# Patient Record
Sex: MALE | Race: BLACK OR AFRICAN AMERICAN | NOT HISPANIC OR LATINO | Employment: UNEMPLOYED | URBAN - METROPOLITAN AREA
[De-identification: names, ages, dates, MRNs, and addresses within clinical notes are randomized per-mention and may not be internally consistent; named-entity substitution may affect disease eponyms.]

---

## 2023-10-31 ENCOUNTER — HOSPITAL ENCOUNTER (OUTPATIENT)
Facility: HOSPITAL | Age: 53
Discharge: HOME | End: 2023-11-01
Attending: EMERGENCY MEDICINE | Admitting: EMERGENCY MEDICINE
Payer: MEDICAID

## 2023-10-31 DIAGNOSIS — I10 PRIMARY HYPERTENSION: ICD-10-CM

## 2023-10-31 DIAGNOSIS — E87.6 HYPOKALEMIA: ICD-10-CM

## 2023-10-31 DIAGNOSIS — R07.9 CHEST PAIN: ICD-10-CM

## 2023-10-31 DIAGNOSIS — F10.921 ALCOHOL INTOXICATION WITH DELIRIUM: Primary | ICD-10-CM

## 2023-10-31 DIAGNOSIS — R41.82 ALTERED MENTAL STATUS: ICD-10-CM

## 2023-10-31 DIAGNOSIS — E83.42 HYPOMAGNESEMIA: ICD-10-CM

## 2023-10-31 LAB
ALLENS TEST: ABNORMAL
APAP SERPL-MCNC: <3 UG/ML (ref 10–20)
BASOPHILS # BLD AUTO: 0.05 K/UL (ref 0–0.2)
BASOPHILS NFR BLD: 0.7 % (ref 0–1.9)
BNP SERPL-MCNC: 34 PG/ML (ref 0–99)
DIFFERENTIAL METHOD: ABNORMAL
EOSINOPHIL # BLD AUTO: 0.1 K/UL (ref 0–0.5)
EOSINOPHIL NFR BLD: 1.3 % (ref 0–8)
ERYTHROCYTE [DISTWIDTH] IN BLOOD BY AUTOMATED COUNT: 12.4 % (ref 11.5–14.5)
ETHANOL SERPL-MCNC: 244 MG/DL
HCO3 UR-SCNC: 14.5 MMOL/L (ref 24–28)
HCT VFR BLD AUTO: 41 % (ref 40–54)
HGB BLD-MCNC: 13.1 G/DL (ref 14–18)
IMM GRANULOCYTES # BLD AUTO: 0.09 K/UL (ref 0–0.04)
IMM GRANULOCYTES NFR BLD AUTO: 1.2 % (ref 0–0.5)
LYMPHOCYTES # BLD AUTO: 3.3 K/UL (ref 1–4.8)
LYMPHOCYTES NFR BLD: 42.6 % (ref 18–48)
MCH RBC QN AUTO: 30.3 PG (ref 27–31)
MCHC RBC AUTO-ENTMCNC: 32 G/DL (ref 32–36)
MCV RBC AUTO: 95 FL (ref 82–98)
MONOCYTES # BLD AUTO: 0.6 K/UL (ref 0.3–1)
MONOCYTES NFR BLD: 7.6 % (ref 4–15)
NEUTROPHILS # BLD AUTO: 3.6 K/UL (ref 1.8–7.7)
NEUTROPHILS NFR BLD: 46.6 % (ref 38–73)
NRBC BLD-RTO: 0 /100 WBC
PCO2 BLDA: 38.1 MMHG (ref 35–45)
PH SMN: 7.19 [PH] (ref 7.35–7.45)
PLATELET # BLD AUTO: 302 K/UL (ref 150–450)
PMV BLD AUTO: 9 FL (ref 9.2–12.9)
PO2 BLDA: 28 MMHG (ref 40–60)
POC BE: -14 MMOL/L
POC SATURATED O2: 39 % (ref 95–100)
POC TCO2: 16 MMOL/L (ref 24–29)
RBC # BLD AUTO: 4.32 M/UL (ref 4.6–6.2)
SALICYLATES SERPL-MCNC: <5 MG/DL (ref 15–30)
SAMPLE: ABNORMAL
SITE: ABNORMAL
T4 FREE SERPL-MCNC: 1.12 NG/DL (ref 0.71–1.51)
TROPONIN I SERPL DL<=0.01 NG/ML-MCNC: <0.006 NG/ML (ref 0–0.03)
TSH SERPL DL<=0.005 MIU/L-ACNC: 0.34 UIU/ML (ref 0.4–4)
WBC # BLD AUTO: 7.67 K/UL (ref 3.9–12.7)

## 2023-10-31 PROCEDURE — 93010 ELECTROCARDIOGRAM REPORT: CPT | Mod: ,,, | Performed by: INTERNAL MEDICINE

## 2023-10-31 PROCEDURE — 99900035 HC TECH TIME PER 15 MIN (STAT)

## 2023-10-31 PROCEDURE — 82803 BLOOD GASES ANY COMBINATION: CPT

## 2023-10-31 PROCEDURE — 99285 EMERGENCY DEPT VISIT HI MDM: CPT | Mod: 25

## 2023-10-31 PROCEDURE — 82077 ASSAY SPEC XCP UR&BREATH IA: CPT

## 2023-10-31 PROCEDURE — 84439 ASSAY OF FREE THYROXINE: CPT

## 2023-10-31 PROCEDURE — 80143 DRUG ASSAY ACETAMINOPHEN: CPT

## 2023-10-31 PROCEDURE — 85025 COMPLETE CBC W/AUTO DIFF WBC: CPT

## 2023-10-31 PROCEDURE — 96375 TX/PRO/DX INJ NEW DRUG ADDON: CPT

## 2023-10-31 PROCEDURE — 93010 EKG 12-LEAD: ICD-10-PCS | Mod: ,,, | Performed by: INTERNAL MEDICINE

## 2023-10-31 PROCEDURE — 80179 DRUG ASSAY SALICYLATE: CPT

## 2023-10-31 PROCEDURE — 84484 ASSAY OF TROPONIN QUANT: CPT

## 2023-10-31 PROCEDURE — 93005 ELECTROCARDIOGRAM TRACING: CPT

## 2023-10-31 PROCEDURE — 84443 ASSAY THYROID STIM HORMONE: CPT

## 2023-10-31 PROCEDURE — 83880 ASSAY OF NATRIURETIC PEPTIDE: CPT

## 2023-10-31 PROCEDURE — 63600175 PHARM REV CODE 636 W HCPCS

## 2023-10-31 PROCEDURE — 80053 COMPREHEN METABOLIC PANEL: CPT

## 2023-10-31 RX ORDER — ONDANSETRON 2 MG/ML
4 INJECTION INTRAMUSCULAR; INTRAVENOUS
Status: COMPLETED | OUTPATIENT
Start: 2023-10-31 | End: 2023-10-31

## 2023-10-31 RX ADMIN — ONDANSETRON 4 MG: 2 INJECTION INTRAMUSCULAR; INTRAVENOUS at 09:10

## 2023-10-31 NOTE — Clinical Note
Called pt back to let him know that [facility] lab is still in process, agreeable to discuss all results at upcoming MDV on 5/23   Diagnosis: Alcohol intoxication with delirium [[id number]]   Future Attending Provider: PHUC NIXON [[id number]]   Admitting Provider:: YOLIE CASTANEDA [[id number]]

## 2023-11-01 VITALS
OXYGEN SATURATION: 95 % | WEIGHT: 210 LBS | HEART RATE: 87 BPM | BODY MASS INDEX: 32.96 KG/M2 | TEMPERATURE: 98 F | RESPIRATION RATE: 18 BRPM | DIASTOLIC BLOOD PRESSURE: 100 MMHG | HEIGHT: 67 IN | SYSTOLIC BLOOD PRESSURE: 184 MMHG

## 2023-11-01 PROBLEM — R41.82 ALTERED MENTAL STATUS: Status: ACTIVE | Noted: 2023-11-01

## 2023-11-01 PROBLEM — R82.71 ASYMPTOMATIC BACTERIURIA: Status: ACTIVE | Noted: 2023-11-01

## 2023-11-01 PROBLEM — R45.851 SUICIDAL IDEATION: Status: ACTIVE | Noted: 2023-11-01

## 2023-11-01 PROBLEM — F10.10 ALCOHOL ABUSE: Status: ACTIVE | Noted: 2023-11-01

## 2023-11-01 PROBLEM — F10.20 ALCOHOLISM: Status: ACTIVE | Noted: 2023-11-01

## 2023-11-01 PROBLEM — F10.20 ALCOHOLISM: Status: RESOLVED | Noted: 2023-11-01 | Resolved: 2023-11-01

## 2023-11-01 PROBLEM — E87.20 METABOLIC ACIDOSIS, NORMAL ANION GAP (NAG): Status: ACTIVE | Noted: 2023-11-01

## 2023-11-01 LAB
ALBUMIN SERPL BCP-MCNC: 3.4 G/DL (ref 3.5–5.2)
ALLENS TEST: ABNORMAL
ALP SERPL-CCNC: 47 U/L (ref 55–135)
ALT SERPL W/O P-5'-P-CCNC: 17 U/L (ref 10–44)
AMPHET+METHAMPHET UR QL: NEGATIVE
ANION GAP SERPL CALC-SCNC: 10 MMOL/L (ref 8–16)
ANION GAP SERPL CALC-SCNC: 12 MMOL/L (ref 8–16)
ANION GAP SERPL CALC-SCNC: 8 MMOL/L (ref 8–16)
APAP SERPL-MCNC: <3 UG/ML (ref 10–20)
AST SERPL-CCNC: 19 U/L (ref 10–40)
BACTERIA #/AREA URNS AUTO: ABNORMAL /HPF
BARBITURATES UR QL SCN>200 NG/ML: NEGATIVE
BENZODIAZ UR QL SCN>200 NG/ML: NEGATIVE
BILIRUB SERPL-MCNC: 0.6 MG/DL (ref 0.1–1)
BILIRUB UR QL STRIP: NEGATIVE
BUN SERPL-MCNC: 10 MG/DL (ref 6–20)
BUN SERPL-MCNC: 8 MG/DL (ref 6–20)
BUN SERPL-MCNC: 9 MG/DL (ref 6–20)
BZE UR QL SCN: NEGATIVE
CALCIUM SERPL-MCNC: 6.1 MG/DL (ref 8.7–10.5)
CALCIUM SERPL-MCNC: 7.6 MG/DL (ref 8.7–10.5)
CALCIUM SERPL-MCNC: 8.7 MG/DL (ref 8.7–10.5)
CANNABINOIDS UR QL SCN: NEGATIVE
CHLORIDE SERPL-SCNC: 107 MMOL/L (ref 95–110)
CHLORIDE SERPL-SCNC: 112 MMOL/L (ref 95–110)
CHLORIDE SERPL-SCNC: 121 MMOL/L (ref 95–110)
CLARITY UR REFRACT.AUTO: ABNORMAL
CO2 SERPL-SCNC: 17 MMOL/L (ref 23–29)
CO2 SERPL-SCNC: 18 MMOL/L (ref 23–29)
CO2 SERPL-SCNC: 21 MMOL/L (ref 23–29)
COLOR UR AUTO: ABNORMAL
CREAT SERPL-MCNC: 0.5 MG/DL (ref 0.5–1.4)
CREAT SERPL-MCNC: 0.6 MG/DL (ref 0.5–1.4)
CREAT SERPL-MCNC: 0.8 MG/DL (ref 0.5–1.4)
CREAT UR-MCNC: 18 MG/DL (ref 23–375)
EST. GFR  (NO RACE VARIABLE): >60 ML/MIN/1.73 M^2
ETHANOL SERPL-MCNC: 128 MG/DL
GLUCOSE SERPL-MCNC: 113 MG/DL (ref 70–110)
GLUCOSE SERPL-MCNC: 82 MG/DL (ref 70–110)
GLUCOSE SERPL-MCNC: 92 MG/DL (ref 70–110)
GLUCOSE UR QL STRIP: NEGATIVE
HCO3 UR-SCNC: 17.6 MMOL/L (ref 24–28)
HGB UR QL STRIP: NEGATIVE
KETONES UR QL STRIP: NEGATIVE
LEUKOCYTE ESTERASE UR QL STRIP: ABNORMAL
MAGNESIUM SERPL-MCNC: 1.4 MG/DL (ref 1.6–2.6)
METHADONE UR QL SCN>300 NG/ML: NEGATIVE
MICROSCOPIC COMMENT: ABNORMAL
NITRITE UR QL STRIP: NEGATIVE
OPIATES UR QL SCN: NEGATIVE
PCO2 BLDA: 44.2 MMHG (ref 35–45)
PCP UR QL SCN>25 NG/ML: NEGATIVE
PH SMN: 7.21 [PH] (ref 7.35–7.45)
PH UR STRIP: 6 [PH] (ref 5–8)
PO2 BLDA: 26 MMHG (ref 40–60)
POC BE: -10 MMOL/L
POC SATURATED O2: 37 % (ref 95–100)
POC TCO2: 19 MMOL/L (ref 24–29)
POTASSIUM SERPL-SCNC: 2.7 MMOL/L (ref 3.5–5.1)
POTASSIUM SERPL-SCNC: 3.5 MMOL/L (ref 3.5–5.1)
POTASSIUM SERPL-SCNC: 4.7 MMOL/L (ref 3.5–5.1)
PROT SERPL-MCNC: 6.8 G/DL (ref 6–8.4)
PROT UR QL STRIP: NEGATIVE
RBC #/AREA URNS AUTO: 2 /HPF (ref 0–4)
SAMPLE: ABNORMAL
SITE: ABNORMAL
SODIUM SERPL-SCNC: 140 MMOL/L (ref 136–145)
SODIUM SERPL-SCNC: 140 MMOL/L (ref 136–145)
SODIUM SERPL-SCNC: 146 MMOL/L (ref 136–145)
SP GR UR STRIP: 1.01 (ref 1–1.03)
SQUAMOUS #/AREA URNS AUTO: 1 /HPF
TOXICOLOGY INFORMATION: ABNORMAL
URN SPEC COLLECT METH UR: ABNORMAL
WBC #/AREA URNS AUTO: 84 /HPF (ref 0–5)
WBC CLUMPS UR QL AUTO: ABNORMAL

## 2023-11-01 PROCEDURE — G0378 HOSPITAL OBSERVATION PER HR: HCPCS

## 2023-11-01 PROCEDURE — 25000003 PHARM REV CODE 250: Performed by: EMERGENCY MEDICINE

## 2023-11-01 PROCEDURE — 63600175 PHARM REV CODE 636 W HCPCS: Performed by: EMERGENCY MEDICINE

## 2023-11-01 PROCEDURE — 82077 ASSAY SPEC XCP UR&BREATH IA: CPT | Performed by: EMERGENCY MEDICINE

## 2023-11-01 PROCEDURE — 96366 THER/PROPH/DIAG IV INF ADDON: CPT

## 2023-11-01 PROCEDURE — 87086 URINE CULTURE/COLONY COUNT: CPT

## 2023-11-01 PROCEDURE — 80307 DRUG TEST PRSMV CHEM ANLYZR: CPT

## 2023-11-01 PROCEDURE — 96372 THER/PROPH/DIAG INJ SC/IM: CPT | Performed by: EMERGENCY MEDICINE

## 2023-11-01 PROCEDURE — 96365 THER/PROPH/DIAG IV INF INIT: CPT

## 2023-11-01 PROCEDURE — 99900035 HC TECH TIME PER 15 MIN (STAT)

## 2023-11-01 PROCEDURE — 82803 BLOOD GASES ANY COMBINATION: CPT

## 2023-11-01 PROCEDURE — 81001 URINALYSIS AUTO W/SCOPE: CPT

## 2023-11-01 PROCEDURE — 25000003 PHARM REV CODE 250

## 2023-11-01 PROCEDURE — 90791 PR PSYCHIATRIC DIAGNOSTIC EVALUATION: ICD-10-PCS | Mod: AF,HB,, | Performed by: PSYCHIATRY & NEUROLOGY

## 2023-11-01 PROCEDURE — 83735 ASSAY OF MAGNESIUM: CPT | Performed by: EMERGENCY MEDICINE

## 2023-11-01 PROCEDURE — 96361 HYDRATE IV INFUSION ADD-ON: CPT

## 2023-11-01 PROCEDURE — 90791 PSYCH DIAGNOSTIC EVALUATION: CPT | Mod: AF,HB,, | Performed by: PSYCHIATRY & NEUROLOGY

## 2023-11-01 PROCEDURE — 80048 BASIC METABOLIC PNL TOTAL CA: CPT | Mod: 91

## 2023-11-01 PROCEDURE — 80143 DRUG ASSAY ACETAMINOPHEN: CPT | Performed by: EMERGENCY MEDICINE

## 2023-11-01 PROCEDURE — 96376 TX/PRO/DX INJ SAME DRUG ADON: CPT

## 2023-11-01 PROCEDURE — 80048 BASIC METABOLIC PNL TOTAL CA: CPT | Performed by: EMERGENCY MEDICINE

## 2023-11-01 RX ORDER — POTASSIUM CHLORIDE 20 MEQ/1
20 TABLET, EXTENDED RELEASE ORAL 2 TIMES DAILY
Qty: 10 TABLET | Refills: 0 | Status: SHIPPED | OUTPATIENT
Start: 2023-11-01 | End: 2023-11-01 | Stop reason: HOSPADM

## 2023-11-01 RX ORDER — ONDANSETRON 8 MG/1
8 TABLET, ORALLY DISINTEGRATING ORAL EVERY 8 HOURS PRN
Status: DISCONTINUED | OUTPATIENT
Start: 2023-11-01 | End: 2023-11-01 | Stop reason: HOSPADM

## 2023-11-01 RX ORDER — POTASSIUM CHLORIDE 20 MEQ/1
40 TABLET, EXTENDED RELEASE ORAL ONCE
Status: DISCONTINUED | OUTPATIENT
Start: 2023-11-01 | End: 2023-11-01

## 2023-11-01 RX ORDER — NALOXONE HCL 0.4 MG/ML
0.02 VIAL (ML) INJECTION
Status: DISCONTINUED | OUTPATIENT
Start: 2023-11-01 | End: 2023-11-01 | Stop reason: HOSPADM

## 2023-11-01 RX ORDER — CALCIUM GLUCONATE 20 MG/ML
1 INJECTION, SOLUTION INTRAVENOUS ONCE
Status: DISCONTINUED | OUTPATIENT
Start: 2023-11-01 | End: 2023-11-01

## 2023-11-01 RX ORDER — ONDANSETRON 2 MG/ML
4 INJECTION INTRAMUSCULAR; INTRAVENOUS
Status: COMPLETED | OUTPATIENT
Start: 2023-11-01 | End: 2023-11-01

## 2023-11-01 RX ORDER — ACETAMINOPHEN 325 MG/1
650 TABLET ORAL EVERY 4 HOURS PRN
Status: DISCONTINUED | OUTPATIENT
Start: 2023-11-01 | End: 2023-11-01 | Stop reason: HOSPADM

## 2023-11-01 RX ORDER — THIAMINE HCL 100 MG
100 TABLET ORAL DAILY
Status: DISCONTINUED | OUTPATIENT
Start: 2023-11-01 | End: 2023-11-01 | Stop reason: HOSPADM

## 2023-11-01 RX ORDER — LANOLIN ALCOHOL/MO/W.PET/CERES
400 CREAM (GRAM) TOPICAL DAILY
Qty: 5 TABLET | Refills: 0 | Status: SHIPPED | OUTPATIENT
Start: 2023-11-01 | End: 2023-11-01 | Stop reason: HOSPADM

## 2023-11-01 RX ORDER — LORAZEPAM 2 MG/ML
2 INJECTION INTRAMUSCULAR
Status: DISCONTINUED | OUTPATIENT
Start: 2023-11-01 | End: 2023-11-01 | Stop reason: HOSPADM

## 2023-11-01 RX ORDER — IBUPROFEN 200 MG
24 TABLET ORAL
Status: DISCONTINUED | OUTPATIENT
Start: 2023-11-01 | End: 2023-11-01 | Stop reason: HOSPADM

## 2023-11-01 RX ORDER — GLUCAGON 1 MG
1 KIT INJECTION
Status: DISCONTINUED | OUTPATIENT
Start: 2023-11-01 | End: 2023-11-01 | Stop reason: HOSPADM

## 2023-11-01 RX ORDER — HYDRALAZINE HYDROCHLORIDE 25 MG/1
50 TABLET, FILM COATED ORAL ONCE
Status: COMPLETED | OUTPATIENT
Start: 2023-11-01 | End: 2023-11-01

## 2023-11-01 RX ORDER — AMLODIPINE BESYLATE 5 MG/1
5 TABLET ORAL DAILY
Qty: 90 TABLET | Refills: 3 | Status: SHIPPED | OUTPATIENT
Start: 2023-11-01 | End: 2024-10-31

## 2023-11-01 RX ORDER — MAGNESIUM SULFATE HEPTAHYDRATE 40 MG/ML
2 INJECTION, SOLUTION INTRAVENOUS
Status: DISCONTINUED | OUTPATIENT
Start: 2023-11-01 | End: 2023-11-01

## 2023-11-01 RX ORDER — IBUPROFEN 200 MG
16 TABLET ORAL
Status: DISCONTINUED | OUTPATIENT
Start: 2023-11-01 | End: 2023-11-01 | Stop reason: HOSPADM

## 2023-11-01 RX ORDER — MAGNESIUM SULFATE HEPTAHYDRATE 40 MG/ML
2 INJECTION, SOLUTION INTRAVENOUS ONCE
Status: DISCONTINUED | OUTPATIENT
Start: 2023-11-01 | End: 2023-11-01 | Stop reason: HOSPADM

## 2023-11-01 RX ORDER — SODIUM CHLORIDE 0.9 % (FLUSH) 0.9 %
10 SYRINGE (ML) INJECTION EVERY 12 HOURS PRN
Status: DISCONTINUED | OUTPATIENT
Start: 2023-11-01 | End: 2023-11-01 | Stop reason: HOSPADM

## 2023-11-01 RX ORDER — PROMETHAZINE HYDROCHLORIDE 25 MG/1
25 TABLET ORAL EVERY 6 HOURS PRN
Status: DISCONTINUED | OUTPATIENT
Start: 2023-11-01 | End: 2023-11-01 | Stop reason: HOSPADM

## 2023-11-01 RX ORDER — HALOPERIDOL 5 MG/ML
5 INJECTION INTRAMUSCULAR
Status: COMPLETED | OUTPATIENT
Start: 2023-11-01 | End: 2023-11-01

## 2023-11-01 RX ORDER — POTASSIUM CHLORIDE 7.45 MG/ML
10 INJECTION INTRAVENOUS ONCE
Status: COMPLETED | OUTPATIENT
Start: 2023-11-01 | End: 2023-11-01

## 2023-11-01 RX ORDER — FOLIC ACID 1 MG/1
1 TABLET ORAL DAILY
Status: DISCONTINUED | OUTPATIENT
Start: 2023-11-01 | End: 2023-11-01 | Stop reason: HOSPADM

## 2023-11-01 RX ORDER — LORAZEPAM 2 MG/ML
2 INJECTION INTRAMUSCULAR
Status: COMPLETED | OUTPATIENT
Start: 2023-11-01 | End: 2023-11-01

## 2023-11-01 RX ADMIN — SODIUM CHLORIDE, POTASSIUM CHLORIDE, SODIUM LACTATE AND CALCIUM CHLORIDE 1000 ML: 600; 310; 30; 20 INJECTION, SOLUTION INTRAVENOUS at 12:11

## 2023-11-01 RX ADMIN — LORAZEPAM 2 MG: 2 INJECTION INTRAMUSCULAR; INTRAVENOUS at 05:11

## 2023-11-01 RX ADMIN — HALOPERIDOL LACTATE 5 MG: 5 INJECTION, SOLUTION INTRAMUSCULAR at 05:11

## 2023-11-01 RX ADMIN — HYDRALAZINE HYDROCHLORIDE 50 MG: 25 TABLET, FILM COATED ORAL at 04:11

## 2023-11-01 RX ADMIN — DEXTROSE AND SODIUM CHLORIDE 1000 ML: 5; 900 INJECTION, SOLUTION INTRAVENOUS at 03:11

## 2023-11-01 RX ADMIN — ONDANSETRON 4 MG: 2 INJECTION INTRAMUSCULAR; INTRAVENOUS at 02:11

## 2023-11-01 RX ADMIN — POTASSIUM CHLORIDE 10 MEQ: 7.46 INJECTION, SOLUTION INTRAVENOUS at 04:11

## 2023-11-01 RX ADMIN — POTASSIUM BICARBONATE 20 MEQ: 391 TABLET, EFFERVESCENT ORAL at 03:11

## 2023-11-01 NOTE — PROVIDER PROGRESS NOTES - EMERGENCY DEPT.
Encounter Date: 10/31/2023    ED Physician Progress Notes        Physician Note:   Case signed out to me at 10:00 p.m..  Patient with altered mental status.  Alcohol level is high.  Scans are negative.  Awaiting sobriety.    I evaluated the patient shortly after checkout.  He is arousable but appears intoxicated.  His bicarb was slightly low and his pH was low his blood gas.  Likely alcoholic ketoacidosis.  Will give IV fluids.    At midnight patient was awake and walk to the bathroom.    Reassessed patient at 1:00 a.m..  He is resting comfortably.  Satting 98% on room air.  Getting IV fluids.    I reviewed blood work.  Still has acidosis.  Potassium is now low.  Add on a magnesium.    4:10 AM  Patient is now awake and sober.  He states that he was trying to have fun at [facility] and drank too much.  I reviewed the lab work.  Recommend placing in our ED observation unit for continued monitoring.  States that he is not interested in this and would like to be released.  Would like 1 more hour before we let him go.  This will give me time to check his magnesium and give him more fluids.  He states that he intends to signed out AMA.    5:07 AM  I discussed releasing the patient against medical advice.  He revealed to me that he has been very depressed lately.  He states that he has been at the end of his rope.  He states that he was drinking heavily last night and attempt to kill himself.  He states that he is been suicidal for the last month.  I informed him that we would need to place him under a psychiatric hold and keep him here.  He was upset by this and states that he had to leave.  I believe with what he had shared with me that I am obligated to place him under a physician's emergency certificate.  Contact Hospital Medicine.  I am unable to clear him with his electrolyte abnormalities at this point.  His magnesium is low potassium is low bicarb and pH are low.    5:37 AM  Patient became quite hostile and  threatening.  Pulled out his IV but he requires for medical treatment.  I discussed sedation with him.  He states we could do what we have to do.  Haldol and Ativan ordered.  Medicine agreed to admit.    I spent 45 minutes in direct patient care.  This was done reviewing medical records reviewing labs evaluating patient is re-evaluated patient and consulting with specialists.

## 2023-11-01 NOTE — PLAN OF CARE
[facility] - Emergency Dept  Initial Discharge Assessment       Primary Care Provider: [facility]    Admission Diagnosis: Alcohol intoxication with delirium [F10.921]    Admission Date: 10/31/2023  Expected Discharge Date: 11/1/2023    Transition of Care Barriers: (P) Social, Mental illness, Substance Abuse    Payor: MEDICAID / Plan: [facility] COMMUNITY PLAN [facility] ([place] MEDICAID) / Product Type: Managed Medicaid /     No emergency contact information on file.    Discharge Plan A: (P) Home         [id number] - [place] - [address] [facility]  [address] [facility] [address]  Phone: [phone] Fax: [phone]      Initial Assessment (most recent)       Adult Discharge Assessment - 11/01/23 1645          Discharge Assessment    Assessment Type Discharge Planning Assessment (P)      Confirmed/corrected address, phone number and insurance Yes (P)      Confirmed Demographics Correct on Facesheet (P)      Source of Information patient;health record (P)      Prior to hospitilization cognitive status: Alert/Oriented (P)      Current cognitive status: Alert/Oriented (P)      Equipment Currently Used at Home none (P)      Readmission within 30 days? No (P)      Are you on dialysis? No (P)      DME Needed Upon Discharge  none (P)      Discharge Plan discussed with: Patient (P)      Transition of Care Barriers Social;Mental illness;Substance Abuse (P)      Discharge Plan A Home (P)         Physical Activity    On average, how many days per week do you engage in moderate to strenuous exercise (like a brisk walk)? 0 days (P)      On average, how many minutes do you engage in exercise at this level? 0 min (P)         Financial Resource Strain    How hard is it for you to pay for the very basics like food, housing, medical care, and heating? Patient refused (P)         Housing Stability    In the last 12 months, was there a time when you were not  able to pay the mortgage or rent on time? Patient refused (P)      In the last 12 months, was there a time when you did not have a steady place to sleep or slept in a shelter (including now)? Patient refused (P)         Transportation Needs    In the past 12 months, has lack of transportation kept you from medical appointments or from getting medications? Patient refused (P)      In the past 12 months, has lack of transportation kept you from meetings, work, or from getting things needed for daily living? Patient refused (P)         Food Insecurity    Within the past 12 months, you worried that your food would run out before you got the money to buy more. Patient refused (P)      Within the past 12 months, the food you bought just didn't last and you didn't have money to get more. Patient refused (P)         Stress    Do you feel stress - tense, restless, nervous, or anxious, or unable to sleep at night because your mind is troubled all the time - these days? Patient refused (P)         Social Connections    In a typical week, how many times do you talk on the phone with family, friends, or neighbors? Patient refused (P)      How often do you get together with friends or relatives? Patient refused (P)      How often do you attend Islam or Religion services? Patient refused (P)      Do you belong to any clubs or organizations such as Islam groups, unions, fraternal or athletic groups, or school groups? Patient refused (P)      How often do you attend meetings of the clubs or organizations you belong to? Patient refused (P)      Are you , , , , never , or living with a partner? Patient refused (P)         Alcohol Use    Q1: How often do you have a drink containing alcohol? 4 or more times a week (P)      Q2: How many drinks containing alcohol do you have on a typical day when you are drinking? 5 or 6 (P)      Q3: How often do you have six or more drinks on one occasion? Weekly  (P)

## 2023-11-01 NOTE — CARE UPDATE
IM3 examined patient at bedside. Resting comfortably with lights off. He is Aox3 and not expressing suicidal ideation at this time. Patient is answering questions appropriately. HM will rescind PEC.

## 2023-11-01 NOTE — ED NOTES
Went into pt's room to give BP medications, set up ride home and give AVS paperwork before d/c. Pt not in room or outside. No IV in place. PA notified and aware.

## 2023-11-01 NOTE — ED TRIAGE NOTES
[facility], a 89 y.o. adult presents to the ED w/ complaint of altered mental status. Pt found down by police outside bus stop. Nonverbal, only responds to painful stimuli. Baseline and last known normal unknown.     Triage note:  Chief Complaint   Patient presents with    Altered Mental Status     Pt found down by police outside bus stop. Nonverbal, only responds to painful stimuli. Unknown pt's baseline or last known normal.      Review of patient's allergies indicates:  Not on File  No past medical history on file.

## 2023-11-01 NOTE — ASSESSMENT & PLAN NOTE
Patient denies dysuria, suprapubic/abdominal tenderness, and flank pain after a UA in the ED revealed significant pyuria    - Patient denies symptomatology  - Will hold on treating with antibiotics for now, consider treating if patient's mental or hemodynamic status deteriorate.

## 2023-11-01 NOTE — ED NOTES
"Pt awake, alert, and oriented trying to get out of bed. Pt states full name and . Pt states that he only drank alcohol and "didn't drink that much." Pt states that he wants to go home. MD notified.   "

## 2023-11-01 NOTE — ED NOTES
No signs of distress noted. Patient is in paper gown. Patient rights signed and on the chart. All cords and wires are out of the patients room. Patient belongings are removed from the room labeled and locked away. P.E.C is completed and on the chart. Patient sitter is at the bedside recording Q 15 minute checks. Sitter belongings are out of the room. Will continue to monitor the patient

## 2023-11-01 NOTE — DISCHARGE INSTRUCTIONS
REFERRAL RECOMMENDATIONS FOR SUBSTANCE ABUSE & MENTAL HEALTH      IN CASE OF SUICIDAL THINKING, call the National Suicide Hotline Number: 988    988 Suicide & Crisis Lifeline: 985 , 9-822-504-OXKP (9841)  https://988[employer].Lucky Pai       SUBSTANCE ABUSE:     [facility] (formerly known as the ABU)  [x] [phone], Option 2  [x] [address] [facility] 4[facility], [place] [address]  [x] [email or web address]  [x] The [facility] delivers comprehensive and collaborative treatment for alcohol and substance use disorders.  Excellent program for working professionals or anyone else seeking recovery.  [x] Requires insurance approval prior to starting program, call number above for more information.  [x] Intensive Outpatient Rehabilitation Program - M-F 9am-3pm - daily groups with psychologists and social workers, sessions with MDs 3x per week   [x] Ambulatory detox and dual diagnosis available      SUBOXONE:     NOTE: some Suboxone clinics require their clients to participate in a structured program (such as an IOP) in order to be prescribed Suboxone.  Some clinics have a long waiting list.  Most of these clinics do not accept walk-in clients, so call first to to learn what must be done to get started on Suboxone.    [facility] - [phone] (can do Sublocade)  [address] [facility], [place] [address]    [address]  [phone]    [facility] - [phone] (can do Sublocade)  [address] [place] [address]    [facility]  [address] [place]  [phone]     [facility] (very short waiting list, may accept some walk-in's but call first if possible)  [address], [place] [address]  [phone]; [phone]    [facility]   [address] [place]    [phone]    [facility] (can usually be seen within a week but is cash only  for appointment)  [address] [place], [place]    [facility] ([facility])  [address], [address] [place] [address] [phone]    [facility] ([facility])  [address], [place]  [phone]    [facility] ([facility])  [address] [facility] [address]  [phone]    [place], [place]:    [facility] - [address] [place] [address] - Tel: [phone]    Sebastian Romero - [address] [place] [address] - Tel: [phone]    Rell Galindo - [address] [address] - Tel: [phone]    Keon Abbott - [address] [place] [address] - Tel: [phone]    Heraclio Martino - [address] [place] [address] - Tel: [phone]    [place], [place]:     Dr. Koki Saunders and Dr. Dinesh Driver - [address] [place], [place] - Tel: [phone]    Dr. Mandie Carmona - [address] [place], [place] - Tel: [phone]    Dr. Silvestre Campos - Tel: [phone]    Dr. Yaniv Jules - [facility] - [phone]      METHADONE:     [facility] (the only methadone clinic in the [facility], has two locations)  [x] [place] [facility] [place] [address], [phone]  [x] [facility] [place] [address], [phone]    12 STEP PROGRAMS (and similar):     Alcoholics Anonymous (local)  [x] [phone]  [x] www.[place].org for schedules for in-person and online meetings  [x] There are AA meetings throughout the day all over town  [x] AA costs nothing to attend; they pass a basket for donations but this is not required    Narcotics Anonymous  [x] [phone]  [x] www.noana.org  [x] There are NA meetings throughout the day all over town  [x] NA costs nothing to attend; they pass a basket for donations but this is not required    Alcoholics Anonymous Online Intergroup (national)  [x] www.aa-intergroup.org  [x] Good resource for large, nation-wide meetings  [x] Can also attend smaller, local meetings in other cities  [x] Countless meetings  all day and all night  [x] AA costs nothing to attend; they pass a basket for donations but this is not required    Flying Sober - 24/7 zoom meetings for women and coed - sign on anytime, anywhere!  https://flyingMobile Automationsober.[employer]/16-3-sekttgfe/    Online Intergroup of AA - 121 Open AA [place] Meeting - 24/7 zoom meetings  https://aa-intergroup.org/meetings/    LOOKING FOR AN ALTERNATIVE TO 12 STEP PROGRAMS - check out:  SMART Recovery: https://www.smartrecovery.org/about-us  Terry Recovery: https://recoverydharma.org      DETOX UNITS (USUALLY 5-7 DAYS):     [facility]: [address], [place]  [phone], call first to ensure bed availability    [facility]: [address] [facility] [place]  [phone], Option 1, call first to ensure bed availability    [facility]: [facility] Sanjeev Dotson, [facility]  [phone] (intake by appointment only)    [facility]: [address], [place]  [phone]      INTENSIVE OUTPATIENT PROGRAMS:     [facility] (formerly known as the ABU)  [x] [phone], Option 2  [x] [address] [facility] 4th [facility], [facility] [address]  [x] [email or web address]  [x] [facility] Program delivers comprehensive and collaborative treatment for alcohol and substance use disorders.  Excellent program for working professionals or anyone else seeking recovery.  [x] Requires insurance approval prior to starting program, call number above for more information.  [x] Intensive Outpatient Rehabilitation Program - M-F 9am-3pm - daily groups with psychologists and social workers, sessions with MDs 3x per week   [x] Ambulatory detox and dual diagnosis available    [facility] Intensive [facility]patient Program  [x] [phone]  [x] [address] [facility] (the clinic not on [facility])  [x] Call number above for more info and to check insurance requirements    [address] [place] [address]  [phone]    [facility]:  [address] [facility], [address]?, [place], [place] [address]?, [phone]  [address] [facility]?, [place], [place] [address]?, [phone]    RESIDENTIAL REHABS (USUALLY 28 DAYS):     [facility]: [address], [phone]    [place] [facility]: [address] [place]  [phone] (intake by appointment only)    [facility] (men only) [address], [phone]    [facility] (Female only) [address] [place], [phone]    [facility]: [address], [place], men's program [phone], women's program [phone]    [facility]: [address] [place] [address], [phone]    [facility]: [address], [place], [place], [phone]    [facility]: Men only, [phone], [address] [facility] Brian Klein [facility]: [address], [place], [place], [phone]    [facility]: [address] [place] [address],  [phone]  New Location: [address] [place] [address] [address], [place] [address], [phone]    [facility]:   ?[address]?[place], [place] [address]?[phone]    [place]: [address] [place] [address], [place] [address], [phone]    [place]: [place], [phone]     [facility]: [place], [place], [phone]    [facility]: BRANDEE Mckeon, [phone]    [facility]: [place], [place], [phone]    [place]: BRANDEE Mckeon, [phone]    [facility]: [address], [place] [address], [phone]    COMMUNITY ADDICTION CLINICS:     [facility]: [address] [facility], [address] [place] -[phone] -or- [address] [place] [address]    [facility]: [address] [facility], [place]  [address]     [facility]: Clinics [phone]; Crisis [phone]    [facility]: [address] [place] [address]    [facility]: [address] [place] [address]    [facility]: [address] [place] [address],    [facility] Center: 2nd Floor [address] [place] [address]    [facility]: [address] Dots[address], [place] [address]    [facility], [address] [place] [address]    [facility]: [address] [facility], [place] [phone]  (serves youth 16-23 years old)    [facility]: [facility]/[place] [phone]    [facility]: [address] [facility], [place] [phone]    [facility]: [address] [facility] [phone]    [facility] Behavioral [facility]: [address] [facility] [facility], [address], [phone]     [facility]: [address] [facility], [phone], [phone]    RESOURCES IN OTHER [facility]:     [facility]: [address] [facility], [phone], [phone]    [facility]: [address] [facility], [address], [phone]    Health system [facility], [address] [place] [address], [phone]    [facility]: [address] [facility], [phone]    [facility], [address] [facility], [phone] ([facility])    [facility], [address] [facility], [phone] ([facility])    [facility] Health, [address], [place], [phone] ([facility], and [facility])    [facility], [address] [place], [phone] ([facility])    [facility] Hotline [phone], [phone]    [facility], [address] [facility], [facility], [address] [place] [address]  "Blvd[address], [facility] Behavior[facility] Health Center, [address] [facility], [facility], [address] [facility] [place] [facility], [address], [place]    [facility] Human Services, [address], [place] [phone]    [facility], [address] [facility] [phone]    [facility], [address] [facility] [phone]    [facility], [address] [facility], [phone]      Dominican SPEAKING (en español):     Información de la reunión de Alcohólicos Anónimos  [facility], 10:00 am  Habla [facility]  Esta reunión está abierta y cualquiera puede asistir.    Central African speaking Alcoholics anonymous meetings:  El "Raphael [place] AA Skype" es un raphael on line de Alcohólicos Anónimos en [facility]. El raphael es danielle, gratuito y virtual a través de Skype Audio. El raphael funciona mediante nael llamada grupal de voz, por lo que no se utiliza la videollamada, ni se pueden trung las imágenes o rostros de los participantes. Hace solomon años y medio abrimos el primer Raphael de AA por Skype en [facility], godwin actualmente asisten personas desde [place], [place], [place], [place], [place], [place], [place], [place], [place], [place], [place] y [place], entre otros.    El raphael es muy útil para los alcohólicos que residen en lugares donde no se celebran reuniones de AA, o residen en lugares donde las reuniones de AA son un número limitado de días a la semana, o para aquellos compañeros que se hayan de viaje o que, por cualquier motivo, se hayan convalecientes y no pueden desplazarse. Todos los días nos reuniones a las 21:00 (hora española)    Podéis obtener más información sobre el raphael y sergio sesiones en la página web [email or web address]      MENTAL HEALTH:     [facility]  Department of Psychiatry - " Outpatient Clinic  [phone]    [facility] of Psychiatry - General Psychiatry Intensive Outpatient Program  [facility] (formerly known as the BMU)  [phone], option 3    [facility] Psychiat[facility] - Dual Diagnosis Intensive Outpatient Program  [facility] (formerly known as the ABU)  [phone], option 2      [facility] MENTAL HE[facility]ENTERS:     [facility]  (aka [facility], [facility])  Serves [facility].  Serves uninsured patients & those with Medicaid.  Main location: [address] [place] [id number] [phone]  Walk-in's available during regular business hours.  24/7 Crisis Line: [phone]    [facility]  (aka [facility], [facility])  Serves [facility].  Serves uninsured patients, those with Medicaid and some private plans.  Walk-in's available during regular business hours.  Primary care services available as well.  [facility]: [address] [facility]10 [place] [address];  [phone]  [place]: [address] [place] [address];  [phone]  24/7 Crisis Line: [phone]    [facility]  Serves uninsured patients & those with Medicaid, call for more info.  Primary care, pediatrics, HIV treatment, and dentistry services available as well.  Three locations.  [phone]    [facility] [place]?Corporate Office  Serves patients with Medicaid, Medicare, and private insurance  [address]  [place],?[place] [address]  [phone]    [facility]  Serves uninsured on a sliding scale, as well as Medicaid, Medicare, and private plans.  Eight locations around the [facility] area.  [phone]    [facility]  Serves uninsured patients & those with Medicaid, private insurances.  Primary care  available as well.  [phone]  [address] [place] [address]    [facility] Outpatient Psychiatry  Serves veterans who were honorably discharged.  [address] [place] [address]  [phone]  24/7 Veterans Crisis Line: [phone] (Press 1)    If you have private insurance and need to find a specialist, please contact your insurance network to request a list of providers covered by your benefits.      MENTAL HEALTH/ADDICTIVE DISORDERS:     AA ([phone]), NA ([phone])   National Suicide Prevention Lifeline- Call 1-490.665.7714 Available 24 hours everyday  [facility] [phone]; Crisis Line [phone] - Call for options A-F:  Intensive Outpatient Treatment/ Day programs   [facility], please contact   [facility], please contact [phone] or [phone] to speak with an admissions counselor.  [facility] (Methadone Maintenance)   [address] [place] [address], [phone]  [address] [place] [address] [phone]  [facility], [address] [place] [address], [phone]  [facility] [place] [phone]  [facility] [place] please contact [phone]  [facility], [address] [facility], 4th floor [place]DEE [place] [address] Phone: [phone]   Sharlene Ray Office: [address] [place] [address], [phone]  [place] Office: [address] [facility], [address], [place] [address], [phone]  [place] Office: [address] [place] [place] [address] [phone]    Outpatient Substance Abuse Treatment   [facility] (Methadone Maintenance)   [address] [place] [address], [phone]  [address] [place] [address] [phone]  [facility], [address] [place] [address], [phone]  [facility]  [place] Office: [address], [place] [address], [phone]  [place]  Office: [address] [facility], [address], [place] [address], [phone]  Lead Office: [address] [facility], [place] [address] [phone]  [facility], [address] [place] [address] [phone]   [facility], [address] [facility], [address], [phone]  [facility] for [facility] Recover, [address] [place], [place] [phone]    Residential Substance Abuse Treatment   [facility] [address] [facility], [phone]  [facility], [address] [facility], [phone]  [facility] (men only) [address] [place] [address], [phone]  [facility] at [facility] (women only) [address] [place] [address] [phone]  [facility], [address] [place] [address] [phone]  [facility] (women only), intakes at [address] [facility], [phone]  [facility] (7-day program, $100, [address] [place], [phone], [phone], [phone])  [facility] [place] [facility] (Men only, [phone]), [address], Brian (Vets*/Non-Vets)  [facility] (Men only, $400/month program fee) [address] [facility], [phone]  [place] (Women over age 39 only), [address] [facility], [phone]    Out of Area:    [place], [address] [facility], [facility] [address], [place], [place] [phone])  [facility] Program (men only), [address] [facility] [place] [address], [phone]  [facility], [address] [place], [place] ([phone])  [place], [address] [place] [address] Dr. Casiano, [place] ([phone])  [facility], [address] [place] [address], [phone]  [facility] (Women only, has to have mental illness, can be homeless or substance abuser), [phone]        DOMESTIC VIOLENCE RESOURCES:     Advocacy  [facility] (NOF[facility])  [address] [address]    [facility] ? [phone]  Services  provided: emergency shelter, individual advocacy, information and referrals, group support, children's program, medical advocacy, forensic medical exams, primary care, legal assistance, counseling, safety planning, and caregiver support    [facility] HEALING AND EMPOWERMENT [place]  Confidential location  [facility] ? [phone]  Services provided: short term emergency shelter, all services provided are free of charge    [facility] FOR COMMUNITY ADVOCACY  Multiple locations in [facility], [facility], [place], and [facility] ([place], [place], and [place])    [facility] ? [phone]  Services provided: emergency shelter, individual advocacy, information and referrals, group support, children's program, medical advocacy, legal assistance in obtaining restraining orders, counseling, safety planning, and caregiver support    [facility]   Emergency Shelter   [phone]  Emergency Services ,Legal and Financial Assistance Services ,Housing Services ,Support Services     [facility]   [phone]  Emergency Services ,Counseling Services , Housing Services ,Support Services ,Children's Services     WOMEN WITH A VISION  [address] [place] [address]  WWAV ? [phone]  Services provided: advocacy, health education and supportive services, specializing in free healing services for marginalized groups, including LGBTQ individuals and sex workers    SEXUAL TRAUMA AWARENESS AND RESPONSE (STAR)  [address] [place] [address]    STAR ? [phone]  Services provided: individual advocacy, information and referrals, group support, medical advocacy, legal assistance, counseling, and safety planning for survivors of sexual assault    [facility] ([facility])  [address] [place] [address]  [facility] Forensic Program ? [phone]  Services provided: free forensic medical exams for sexual assault and  domestic violence, which can be performed up to 5 days after an incident. It is not necessary to make a police report to receive a forensic medical exam    Legal  [facility]  [address], [place] [address]  [facility] ? [phone]  Services provided: free emergency legal representation for survivors of doemstic violence residing in [facility]. Legal services may include temporary restraining orders, temporary child support, custody, and use of property    [facility] LEGAL SERVICES (SLLS)  [address], [place] [address]  SLLS ? [phone]  Services provided: free legal representation for survivors of domestic violence residing in [facility]. Legal services may include temporary child support, custody, and divorce      HOTLINES:     [facility] DOMESTIC VIOLENCE HOTLINE  [phone]    Services provided: free and confidential hotline for victims and survivors of domestic violence. All calls will be routed to a domestic violence service provided in the victim or survivor's area    NATIONAL HUMAN TRAFFICKING HOTLINE  (702) 525-9742    Services provided: national anti-trafficking hotline serving victims and survivors of human trafficking. Provides information about local resources, and access to safe space to report tips, seek services, and ask for help    VIA LINK  211 or [phone]    Service provided: counselors can provide crisis counseling. Counselors can also provide information and referrals to programs which can help with needs such as food, shelter, medical care, financial assistance, mental health services, substance abuse treatment, senior services, childcare, and more      HOMELESS SHELTERS:      Homeless shelters  [facility]  Emergency shelter for individuals and families  [address]  [phone]  [facility]  Emergency shelter for men only  Meals daily 6am, 2pm, & 6pm  Clothing, case management M-F by appointment  (ID/job/housing/legal assistance), mail  [address] [facility]  [phone]  [place]  Emergency shelter for men  [address] [facility]  [phone]  Emergency shelter for women  [address] [facility]  [phone]  Breakfast & lunch daily, dinner M-F  Case management, job counseling services   [facility]  Emergency shelter for teens and young adults up to 22yo  [address]  [phone]  [place] [facility]  Emergency shelter for women over 19yo and their kids  [address] [place] [address]  [phone]  [facility]  Day program, meals M-F 1PM (arrive early)  Showers, laundry, hygiene kits, showers, phones, , notary services, case management, ID assistance  [address] [facility]  [phone] M-F 8am-2:30pm  [facility]  Day program  [facility] 7:30am-3:30pm,  8:30am-3:30pm  Crisis intervention, employment assistance, food/clothing, hygiene kits, bus tokens, mail  [address] [facility] [address]  [phone]  [facility]  Mobile outreach for homeless persons in [facility]  [phone]  [facility]  Primary healthcare, case management, dental services, TB placement  Call ahead  [address] [facility] [address]  [phone]  [facility]  Connects homeless people with their loved ones in other cities by providing transportation costs   [phone]      [place] RESOURCES:     [place] [facility] Mental Health Crisis Response Team:    Region 12 ([place], [place], [place], and [facility]) ([facility] and [facility])  [phone]      Outpatient Mental Health & Addiction Clinic Resources for both [facility] and [facility]:    [facility] Resources  Website: www.pbmhr.org  Main Number: 777-659-9912    [facility] ([facility])  [address] ([address] [facility]) [place] [address]  [phone]    [facility] ([place]  Area)  [address] ([address] [facility]) [place] [address]  [phone]    [facility]  [address] ([address]) [place] [address]  [phone]    [facility]  [address] ([address] [facility]) [place] [address]  [phone]      Addiction Treatment Resources for both [facility] and [facility] [place]:    [facility] (Detox, Residential, PHP, IOP, and Aftercare Programs)  [address] [place] [address]  [phone]    [facility] (Residential, IOP, Transitional Living, and Aftercare Programs)  [address] [place] [address]  [phone]    [facility] (Inpatient, Residential, Detox, IOP, Outpatient, and Aftercare Programs)  [address] [place] [address]  [phone] or toll free at [phone]      Outpatient Mental Health Psychotherapy Resources for both [facility] and [place]:    Marta Molina, [facility]  [address]  [place] [address]  [phone]  Specialties: Depression, Anxiety, and Life Transitions    Anaya Briseno, PhD  [address]  [place] [address]  [phone]  Specialties: Testing and Evaluation, Education and Learning Disabilities, and ADHD    Sarah Salazar, [facility] [address] [place] [address]  [phone]  Specialties: Obsessive-Compulsive (OCD), Depression, and Relationship Issues    Ban Arthur LPC [address] [place] [address]  [phone]  Specialties: Trauma & PTSD, Mood Disorders, and Anxiety    Ban Alarcon, PhD, [facility] [address] [place] [address]  [phone]  Specialties: Family Conflict, Child, and Relationship Issues    Shelley Andrews LPC [facility] [place] [address] [phone]  Specialties: Anxiety, Depression, and  Anger Management        IN CASE OF SUICIDAL THINKING, call the National Suicide Hotline Number: 988    988 Suicide & Crisis Lifeline: 988 , 3-1844-204-453-TALK (1961)  Provides 24/7, free and confidential support for people in distress, prevention and crisis resources for you or your loved ones, and best practices for professionals.    Call, text or chat.  https://988MediaWheel.org

## 2023-11-01 NOTE — ED NOTES
Pt asleep and resting quietly on stretcher;pt in hospital appropriate scrubs.  Pt refused continuous cardiac and pulse ox monitoring. VS stable. Pt denies pain at this time; no acute distress or discomfort reported or observed.  Pt denies restroom needs at this time; is able to reposition self on stretcher. Bed locked in lowest position; side rails up and locked x 2; call light, bedside table, and personal belongings locked in safe with patient labels. Room assessed for safety measures and cleanliness; no action needed at this time. Tech at bedside for continuous monitoring and visualization of patient.  Pt instructed to alert nurse/tech for assistance and before attempting to get out of bed. Pt denies needs or complaints at this time; will continue to monitor. Will attempt to gain IV access when patient is awake and calm.

## 2023-11-01 NOTE — CONSULTS
CONSULTATION LIAISON PSYCHIATRY INITIAL EVALUATION    Patient Name: Leighton Lane  MRN: [id number]  Patient Class: OP- Observation  Admission Date: 10/31/2023  Attending Physician: Harvey Olivo MD      HPI:   Leighton Lane is a 53 y.o. male with past psychiatric history of alcohol use disorder  presents to the ED/admitted to the hospital for Altered mental status after binge drinking on [employer]. . UDS-    Psychiatry consulted for SI    Attempted to see patient x3 in the am however patient with hypersomnolence 2/2 requiring haldol/ativan PRN for agitation. Patient now awake and alert. He reports that he went out with friends for halloween and drank too much. When asked about his substance use patient does not believe he has a problem because he can go months without drinking. Educated patient on binge drinking patterns and patient expressed understanding. When asked about SI patient states his words were twisted and he was only saying that if God was ready to take him home then it would be his time to go but hopes to live a long life. He denies SI/HI/AVH. Patient not interested in rehab at this time.      Medical Review of Systems:  A comprehensive review of systems was negative.    Psychiatric Review of Systems (is patient experiencing or having changes in):  sleep: no  appetite: no  weight: no  energy/anergy: no  interest/pleasure/anhedonia: no  somatic symptoms: no  libido: no  anxiety/panic: no  guilty/hopelessness: no  concentration: no  Sarika:no  Psychosis: no  Trauma: no  S.I.B.s/risky behavior: no    Past Psychiatric History:  Previous Medication Trials: no  Previous Psychiatric Hospitalizations:no   Previous Suicide Attempts: no  History of Violence: no  Outpatient Psychiatrist: no  Family Psychiatric History: no    Substance Abuse History (with emphasis over the last 12 months):  Recreational Drugs:  denies  Use of Alcohol: occasional, social use and history of blackouts  Tobacco  "Use:yes  Rehab History:no    Social History:  Marital Status: single  Children: 0  Employment Status/Info:  unemployed  :no  Education: college graduate  Special Ed: no  Housing Status: with family  Access to gun: no  Psychosocial Stressors: financial  Functioning Relationships: good support system    Legal History:  Past Charges/Incarcerations: yes  Pending charges:no    Mental Status Exam:  General Appearance: appears stated age, well developed and nourished, adequately groomed and appropriately dressed, in no acute distress  Behavior: normal; cooperative; reasonably friendly, pleasant, and polite; appropriate eye-contact; under good behavioral control  Involuntary Movements and Motor Activity: no abnormal involuntary movements noted; no tics, no tremors, no akathisia, no dystonia, no evidence of tardive dyskinesia; no psychomotor agitation or retardation  Gait and Station: intact, normal gait and station, ambulates without assistance  Speech and Language: intact; normal rate, rhythm, volume, tone, and pitch; conversational, spontaneous, and coherent; speaks and understands English proficiently and fluently; repeats words and phrases, no word finding difficulties are noted  Mood: "fine"  Affect: normal, euthymic, reactive, full-range, mood-congruent, appropriate to situation and context  Thought Process and Associations: intact; linear, goal-directed, organized, and logical; no loosening of associations noted  Thought Content and Perceptions:: no suicidal or homicidal ideation, no auditory or visual hallucinations, no paranoid ideation, no ideas of reference, no evidence of delusions or psychosis  Sensorium and Orientation: intact; alert with clear sensorium; oriented fully to person, place, time and situation  Recent and Remote Memory: grossly intact, able to recall relevant and salient information from the recent and remote past  Attention and Concentration: grossly intact, attentive to the conversation " and not readily distractible  Fund of Knowledge: grossly intact, used appropriate vocabulary and demonstrated an awareness of current events, consistent with educational level achieved  Insight: adequate  Judgment: adequate    CAM ICU positive? no      ASSESSMENT & RECOMMENDATIONS   Alcohol intoxication-improved     OTHER PERTINENT DIAGNOSIS    RISK ASSESSMENT  NO NEED FOR PEC patient NOT in any imminent danger of hurting self or others and not gravely disabled.     FOLLOW UP  Will sign off. Resources provided in patient's discharge instructions.    DISPOSITION - once medically cleared:   Defer to medical team    Please contact ON CALL psychiatry service (24/7) for any acute issues that may arise.    Dr. Tanvi Donnelly   Psychiatry  [facility]  11/1/2023 3:42 PM        --------------------------------------------------------------------------------------------------------------------------------------------------------------------------------------------------------------------------------------    CONTINUED HISTORY & OBJECTIVE clinical data & findings reviewed and noted for above decision making    Past Medical/Surgical History:   History reviewed. No pertinent past medical history.  No past surgical history on file.    Current Medications:   Scheduled Meds:    folic acid  1 mg Oral Daily    magnesium sulfate IVPB  2 g Intravenous Once    multivitamin  1 tablet Oral Daily    thiamine  100 mg Oral Daily     PRN Meds: acetaminophen, dextrose 10%, dextrose 10%, glucagon (human recombinant), glucose, glucose, lorazepam, naloxone, ondansetron, promethazine, sodium chloride 0.9%    Allergies:   Review of patient's allergies indicates:  Not on File    Vitals  Vitals:    11/01/23 1527   BP: (!) 195/103   Pulse: 87   Resp:    Temp: 98.3 °F (36.8 °C)       Labs/Imaging/Studies:  Recent Results (from the past 24 hour(s))   CBC auto differential    Collection Time: 10/31/23  9:29 PM   Result Value Ref  Range    WBC 7.67 3.90 - 12.70 K/uL    RBC 4.32 (L) 4.60 - 6.20 M/uL    Hemoglobin 13.1 (L) 14.0 - 18.0 g/dL    Hematocrit 41.0 40.0 - 54.0 %    MCV 95 82 - 98 fL    MCH 30.3 27.0 - 31.0 pg    MCHC 32.0 32.0 - 36.0 g/dL    RDW 12.4 11.5 - 14.5 %    Platelets 302 150 - 450 K/uL    MPV 9.0 (L) 9.2 - 12.9 fL    Immature Granulocytes 1.2 (H) 0.0 - 0.5 %    Gran # (ANC) 3.6 1.8 - 7.7 K/uL    Immature Grans (Abs) 0.09 (H) 0.00 - 0.04 K/uL    Lymph # 3.3 1.0 - 4.8 K/uL    Mono # 0.6 0.3 - 1.0 K/uL    Eos # 0.1 0.0 - 0.5 K/uL    Baso # 0.05 0.00 - 0.20 K/uL    nRBC 0 0 /100 WBC    Gran % 46.6 38.0 - 73.0 %    Lymph % 42.6 18.0 - 48.0 %    Mono % 7.6 4.0 - 15.0 %    Eosinophil % 1.3 0.0 - 8.0 %    Basophil % 0.7 0.0 - 1.9 %    Differential Method Automated    Comprehensive metabolic panel    Collection Time: 10/31/23  9:29 PM   Result Value Ref Range    Sodium 140 136 - 145 mmol/L    Potassium 3.5 3.5 - 5.1 mmol/L    Chloride 112 (H) 95 - 110 mmol/L    CO2 18 (L) 23 - 29 mmol/L    Glucose 113 (H) 70 - 110 mg/dL    BUN 10 6 - 20 mg/dL    Creatinine 0.6 0.5 - 1.4 mg/dL    Calcium 7.6 (L) 8.7 - 10.5 mg/dL    Total Protein 6.8 6.0 - 8.4 g/dL    Albumin 3.4 (L) 3.5 - 5.2 g/dL    Total Bilirubin 0.6 0.1 - 1.0 mg/dL    Alkaline Phosphatase 47 (L) 55 - 135 U/L    AST 19 10 - 40 U/L    ALT 17 10 - 44 U/L    eGFR >60.0 >60 mL/min/1.73 m^2    Anion Gap 10 8 - 16 mmol/L   Ethanol    Collection Time: 10/31/23  9:29 PM   Result Value Ref Range    Alcohol, Serum 244 (H) <10 mg/dL   Acetaminophen level    Collection Time: 10/31/23  9:29 PM   Result Value Ref Range    Acetaminophen (Tylenol), Serum <3.0 (L) 10.0 - 20.0 ug/mL   Salicylate level    Collection Time: 10/31/23  9:29 PM   Result Value Ref Range    Salicylate Lvl <5.0 (L) 15.0 - 30.0 mg/dL   Troponin I    Collection Time: 10/31/23  9:29 PM   Result Value Ref Range    Troponin I <0.006 0.000 - 0.026 ng/mL   B-Type natriuretic peptide (BNP)    Collection Time: 10/31/23  9:29 PM    Result Value Ref Range    BNP 34 0 - 99 pg/mL   TSH    Collection Time: 10/31/23  9:29 PM   Result Value Ref Range    TSH 0.342 (L) 0.400 - 4.000 uIU/mL   T4, Free    Collection Time: 10/31/23  9:29 PM   Result Value Ref Range    Free T4 1.12 0.71 - 1.51 ng/dL   ISTAT PROCEDURE    Collection Time: 10/31/23 10:41 PM   Result Value Ref Range    POC PH 7.188 (LL) 7.35 - 7.45    POC PCO2 38.1 35 - 45 mmHg    POC PO2 28 (LL) 40 - 60 mmHg    POC HCO3 14.5 (L) 24 - 28 mmol/L    POC BE -14 (L) -2 to 2 mmol/L    POC SATURATED O2 39 95 - 100 %    POC TCO2 16 (L) 24 - 29 mmol/L    Sample VENOUS     Site Other     Allens Test N/A    Urinalysis, Reflex to Urine Culture Urine, Clean Catch    Collection Time: 11/01/23  2:10 AM    Specimen: Urine   Result Value Ref Range    Specimen UA Urine, Clean Catch     Color, UA Straw Yellow, Straw, Rosalie    Appearance, UA Hazy (A) Clear    pH, UA 6.0 5.0 - 8.0    Specific Gravity, UA 1.010 1.005 - 1.030    Protein, UA Negative Negative    Glucose, UA Negative Negative    Ketones, UA Negative Negative    Bilirubin (UA) Negative Negative    Occult Blood UA Negative Negative    Nitrite, UA Negative Negative    Leukocytes, UA 3+ (A) Negative   Drug screen panel, emergency    Collection Time: 11/01/23  2:10 AM   Result Value Ref Range    Benzodiazepines Negative Negative    Methadone metabolites Negative Negative    Cocaine (Metab.) Negative Negative    Opiate Scrn, Ur Negative Negative    Barbiturate Screen, Ur Negative Negative    Amphetamine Screen, Ur Negative Negative    THC Negative Negative    Phencyclidine Negative Negative    Creatinine, Urine 18.0 (L) 23.0 - 375.0 mg/dL    Toxicology Information SEE COMMENT    Urinalysis Microscopic    Collection Time: 11/01/23  2:10 AM   Result Value Ref Range    RBC, UA 2 0 - 4 /hpf    WBC, UA 84 (H) 0 - 5 /hpf    WBC Clumps, UA Few (A) None-Rare    Bacteria Moderate (A) None-Occ /hpf    Squam Epithel, UA 1 /hpf    Microscopic Comment SEE COMMENT     Basic Metabolic Panel    Collection Time: 11/01/23  2:35 AM   Result Value Ref Range    Sodium 146 (H) 136 - 145 mmol/L    Potassium 2.7 (LL) 3.5 - 5.1 mmol/L    Chloride 121 (H) 95 - 110 mmol/L    CO2 17 (L) 23 - 29 mmol/L    Glucose 82 70 - 110 mg/dL    BUN 8 6 - 20 mg/dL    Creatinine 0.5 0.5 - 1.4 mg/dL    Calcium 6.1 (LL) 8.7 - 10.5 mg/dL    Anion Gap 8 8 - 16 mmol/L    eGFR >60.0 >60 mL/min/1.73 m^2   Magnesium    Collection Time: 11/01/23  2:35 AM   Result Value Ref Range    Magnesium 1.4 (L) 1.6 - 2.6 mg/dL   ISTAT PROCEDURE    Collection Time: 11/01/23  2:40 AM   Result Value Ref Range    POC PH 7.208 (LL) 7.35 - 7.45    POC PCO2 44.2 35 - 45 mmHg    POC PO2 26 (LL) 40 - 60 mmHg    POC HCO3 17.6 (L) 24 - 28 mmol/L    POC BE -10 (L) -2 to 2 mmol/L    POC SATURATED O2 37 95 - 100 %    POC TCO2 19 (L) 24 - 29 mmol/L    Sample VENOUS     Site Other     Allens Test N/A    Ethanol    Collection Time: 11/01/23 11:53 AM   Result Value Ref Range    Alcohol, Serum 128 (H) <10 mg/dL   Acetaminophen level    Collection Time: 11/01/23 11:53 AM   Result Value Ref Range    Acetaminophen (Tylenol), Serum <3.0 (L) 10.0 - 20.0 ug/mL   Basic metabolic panel    Collection Time: 11/01/23 11:53 AM   Result Value Ref Range    Sodium 140 136 - 145 mmol/L    Potassium 4.7 3.5 - 5.1 mmol/L    Chloride 107 95 - 110 mmol/L    CO2 21 (L) 23 - 29 mmol/L    Glucose 92 70 - 110 mg/dL    BUN 9 6 - 20 mg/dL    Creatinine 0.8 0.5 - 1.4 mg/dL    Calcium 8.7 8.7 - 10.5 mg/dL    Anion Gap 12 8 - 16 mmol/L    eGFR >60.0 >60 mL/min/1.73 m^2     Imaging Results              CT CERVICAL SPINE WITHOUT CONTRAST (Final result)  Result time 10/31/23 22:07:56      Final result by Crescencio Du DO (10/31/23 22:07:56)                   Impression:      Limited examination secondary to motion artifact.  No acute fracture or subluxation of the cervical spine seen.      Electronically signed by: Crescencio Du  Date:    10/31/2023  Time:    22:07                Narrative:    EXAMINATION:  CT CERVICAL SPINE WITHOUT CONTRAST    CLINICAL HISTORY:  Neck trauma, intoxicated or obtunded (Age >= 16y);    TECHNIQUE:  Low dose axial images, sagittal and coronal reformations were performed though the cervical spine without intravenous contrast.    COMPARISON:  None available.    FINDINGS:  There is motion artifact, limiting evaluation of the C1 and C2 vertebrae, and to a lesser extent the C3..    Alignment: Straightening of the usual cervical lordosis.  Alignment is otherwise normal.    Vertebra: There is no acute fracture or subluxation of the cervical spine.  The vertebral body heights are maintained.    Discs: Discs are maintained in height.    Degenerative changes: No significant degenerative changes.  There is no high-grade osseous spinal canal stenosis.    Miscellaneous: The soft tissues of the neck are unremarkable.  There are mucous retention cysts in the bilateral maxillary sinuses.  The visualized lung apices are clear.                                       CT Head Without Contrast (Final result)  Result time 10/31/23 22:04:50      Final result by Jacek Woodard MD (10/31/23 22:04:50)                   Impression:      No acute intracranial abnormalities.      Electronically signed by: Jacek Woodard MD  Date:    10/31/2023  Time:    22:04               Narrative:    EXAMINATION:  CT HEAD WITHOUT CONTRAST    CLINICAL HISTORY:  Mental status change, unknown cause;    TECHNIQUE:  Low dose axial images were obtained through the head.  Coronal and sagittal reformations were also performed. Contrast was not administered.    COMPARISON:  None.    FINDINGS:  The brain parenchyma appears normal for age with good corticomedullary differentiation.  There is no evidence of acute infarct, hemorrhage, or mass.  The ventricular system is normal in size.  No mass-effect or midline shift.  There are no abnormal extra-axial fluid collections.  Retention cyst bilateral  maxillary antra.  The paranasal sinuses and mastoid air cells are otherwise clear.  The calvarium appears intact. Mild soft tissue scalp swelling/hematoma over the posterior calvarium.                                       X-Ray Chest AP Portable (Final result)  Result time 10/31/23 21:48:14      Final result by rCescencio Du DO (10/31/23 21:48:14)                   Impression:      No acute abnormality.      Electronically signed by: Crescencio Du  Date:    10/31/2023  Time:    21:48               Narrative:    EXAMINATION:  XR CHEST AP PORTABLE    CLINICAL HISTORY:  Chest Pain;    TECHNIQUE:  Single frontal view of the chest was performed.    COMPARISON:  None    FINDINGS:  The lungs are well expanded and clear. No focal opacities are seen. The pleural spaces are clear. The cardiac silhouette is enlarged.  The visualized osseous structures are unremarkable.

## 2023-11-01 NOTE — HPI
"Mr. Leighton Lane is a 53 year old male with no admitted past medical history that presents after being found unresponsive on the side of [facility]. EMS arrived on scene and administered Narcan, without improvement. Patient was transported to [facility] ED for further evaluation. Patient was initially somnolent on presentation, but slowly became more conversant and able to provide history. He reports that he had gone to [facility] to celebrate [facility] as he does most years, but drank too much. Patient reports that he does not remember passing out or being picked up by EMS. He denies any prior incidence of blacking out or regular alcohol consumption. Otherwise, patient denies chest pain, SOB, lightheadedness, N/V, dysuria, abdominal pain, flank/back pain.    On presentation, patient's somnolence slowly improved. ED staff noted that patient appeared to be intoxicated. Patient was afebrile and hemodynamically stable. Labs were notable for hypokalemia at 2.7, CO2 at 17, Ca of 6.1, and Mg at 1.4. Serum EtOH was 244 and UDS was negative. Serum tylenol and salicylate levels were undetectable. UA revealed 3+ leukocytes, 84 WBCs, moderate bacteria, and few WBC clumps. Urine culture pending. VBG revealed pH of 7.188, pCO2 of 38.1, pO2 of 28, and HCO3 of 14.5. CT cervical spine was negative for acute fracture, CTH was unremarkale for acute intracranial abnormality, CXR was unremarkable as well.    While in the ED, patient threatened to leave AMA, but agreed to stay for continuation of IV electrolyte replacement. He reported to ED staff that he "does not want to die, but does not want to live either," and that he has felt this way for the past few months. A Physicians Emergency Certificate was applied to the patient and psychiatry was consulted for further evaluation of SI. Patient became acutely agitated, threatening staff. IM haldol and ativan were administered and patient declined all further physical examination.  "

## 2023-11-01 NOTE — ED NOTES
Psych at bedside. Per psych MD pt not longer needing PEC hold. Internal medicine team aware okay to D/C PEC.

## 2023-11-01 NOTE — ASSESSMENT & PLAN NOTE
Patient reports passive suicidal ideation to ED staff, but denies SI in interview with myself. Patient reported that he had felt this way for months leading up to this hospitalization. PEC was placed, and patient became acutely agitated and threatening, requiring IM Haldol and ativan.    - Psychiatry consulted, appreciate recs  - PEC in place, will consider lifting pending psych eval

## 2023-11-01 NOTE — ED NOTES
Pt urinated on the floor and in the trash can. Informed patient to press the call bell whenever he has to use the restroom so that he may use a bathroom. Pt agreed to push the call button in the future.

## 2023-11-01 NOTE — ED PROVIDER NOTES
Encounter Date: 10/31/2023       History     Chief Complaint   Patient presents with    Altered Mental Status     Pt found down by police outside bus stop. Nonverbal, only responds to painful stimuli. Unknown pt's baseline or last known normal.      Patient is an unknown male of unknown age of unknown medical brought in by EMS found unresponsive on the road.  Patient was given Narcan per EMS and unresponsive.  Patient remained stable vitals however upon arrival to the emergency department patient was in the ambulance [facility] being transported when he had repeated episodes of emesis.  Patient is minimally responsive unable to perform or provide any history.  Still unable to identify patient.    The history is provided by the EMS personnel.     Review of patient's allergies indicates:  Not on File  No past medical history on file.  No past surgical history on file.  No family history on file.         Physical Exam     Initial Vitals   BP Pulse Resp Temp SpO2   10/31/23 2035 10/31/23 2035 10/31/23 2035 10/31/23 2036 --   (!) 146/92 74 14 97.5 °F (36.4 °C)       MAP       --                Physical Exam    Nursing note and vitals reviewed.  Constitutional: Bhavna Murillo appears well-developed and well-nourished.   HENT:   Head: Normocephalic and atraumatic.   Eyes: EOM are normal. Pupils are equal, round, and reactive to light.   Cardiovascular:  Normal rate and regular rhythm.           Pulmonary/Chest: Breath sounds normal. No respiratory distress.   Abdominal: Bhavna Murillo exhibits no distension. There is no abdominal tenderness.     Neurological:   Patient opening eyes and moaning and groaning to painful stimuli  Responding to painful said my in the bilateral upper and lower extremities   Skin: Skin is warm and dry. Capillary refill takes less than 2 seconds.         ED Course   Procedures  Labs Reviewed   CBC W/ AUTO DIFFERENTIAL - Abnormal; Notable for the following components:       Result Value     RBC 4.32 (*)     Hemoglobin 13.1 (*)     MPV 9.0 (*)     Immature Granulocytes 1.2 (*)     Immature Grans (Abs) 0.09 (*)     All other components within normal limits   COMPREHENSIVE METABOLIC PANEL - Abnormal; Notable for the following components:    Chloride 112 (*)     CO2 18 (*)     Glucose 113 (*)     Calcium 7.6 (*)     Albumin 3.4 (*)     Alkaline Phosphatase 47 (*)     All other components within normal limits   ALCOHOL,MEDICAL (ETHANOL) - Abnormal; Notable for the following components:    Alcohol, Serum 244 (*)     All other components within normal limits   ACETAMINOPHEN LEVEL - Abnormal; Notable for the following components:    Acetaminophen (Tylenol), Serum <3.0 (*)     All other components within normal limits   SALICYLATE LEVEL - Abnormal; Notable for the following components:    Salicylate Lvl <5.0 (*)     All other components within normal limits   TROPONIN I   URINALYSIS, REFLEX TO URINE CULTURE   DRUG SCREEN PANEL, URINE EMERGENCY   B-TYPE NATRIURETIC PEPTIDE   TSH          Imaging Results              CT CERVICAL SPINE WITHOUT CONTRAST (Final result)  Result time 10/31/23 22:07:56      Final result by Crescencio Du DO (10/31/23 22:07:56)                   Impression:      Limited examination secondary to motion artifact.  No acute fracture or subluxation of the cervical spine seen.      Electronically signed by: Crescencio Du  Date:    10/31/2023  Time:    22:07               Narrative:    EXAMINATION:  CT CERVICAL SPINE WITHOUT CONTRAST    CLINICAL HISTORY:  Neck trauma, intoxicated or obtunded (Age >= 16y);    TECHNIQUE:  Low dose axial images, sagittal and coronal reformations were performed though the cervical spine without intravenous contrast.    COMPARISON:  None available.    FINDINGS:  There is motion artifact, limiting evaluation of the C1 and C2 vertebrae, and to a lesser extent the C3..    Alignment: Straightening of the usual cervical lordosis.  Alignment is otherwise  normal.    Vertebra: There is no acute fracture or subluxation of the cervical spine.  The vertebral body heights are maintained.    Discs: Discs are maintained in height.    Degenerative changes: No significant degenerative changes.  There is no high-grade osseous spinal canal stenosis.    Miscellaneous: The soft tissues of the neck are unremarkable.  There are mucous retention cysts in the bilateral maxillary sinuses.  The visualized lung apices are clear.                                       CT Head Without Contrast (Final result)  Result time 10/31/23 22:04:50      Final result by Jacek Woodard MD (10/31/23 22:04:50)                   Impression:      No acute intracranial abnormalities.      Electronically signed by: Jacek Woodard MD  Date:    10/31/2023  Time:    22:04               Narrative:    EXAMINATION:  CT HEAD WITHOUT CONTRAST    CLINICAL HISTORY:  Mental status change, unknown cause;    TECHNIQUE:  Low dose axial images were obtained through the head.  Coronal and sagittal reformations were also performed. Contrast was not administered.    COMPARISON:  None.    FINDINGS:  The brain parenchyma appears normal for age with good corticomedullary differentiation.  There is no evidence of acute infarct, hemorrhage, or mass.  The ventricular system is normal in size.  No mass-effect or midline shift.  There are no abnormal extra-axial fluid collections.  Retention cyst bilateral maxillary antra.  The paranasal sinuses and mastoid air cells are otherwise clear.  The calvarium appears intact. Mild soft tissue scalp swelling/hematoma over the posterior calvarium.                                       X-Ray Chest AP Portable (Final result)  Result time 10/31/23 21:48:14      Final result by Crescencio Du DO (10/31/23 21:48:14)                   Impression:      No acute abnormality.      Electronically signed by: Crescencio Du  Date:    10/31/2023  Time:    21:48               Narrative:     EXAMINATION:  XR CHEST AP PORTABLE    CLINICAL HISTORY:  Chest Pain;    TECHNIQUE:  Single frontal view of the chest was performed.    COMPARISON:  None    FINDINGS:  The lungs are well expanded and clear. No focal opacities are seen. The pleural spaces are clear. The cardiac silhouette is enlarged.  The visualized osseous structures are unremarkable.                                       Medications   ondansetron injection 4 mg (4 mg Intravenous Given 10/31/23 2134)     Medical Decision Making  Patient with night ENT presenting to the emergency department for altered mental status.  On examination patient is somnolent arousable to sternal rub.  Patient had episodes of emesis.  Patient hypoxic and placed on 2 L nasal cannula.  Concern for different etiologies of altered mental status including ethanol, intracranial hemorrhage, stroke, talks, ACS.  On arrival EKG obtained with no signs of ST elevations, abnormal intervals or malignant arrhythmias.  CT head with no signs of acute intracranial hemorrhage or CT cervical spine with no signs of fractures.  Chest x-ray ordered and pending at this time.  Labs obtained to evaluate for anemia, leukocytosis, metabolic derangements renal impairment hepatic dysfunction.  No gross derangements noted on lap.  TSH pending at this time.  Troponin negative.  Patient has an elevated ethanol suspicion for alcohol intoxication.  Plan to continue monitoring pending all remaining labs.  Patient care handed over to oncoming care team see progress note for final plan and disposition.    Amount and/or Complexity of Data Reviewed  Labs: ordered.  Radiology: ordered.    Risk  Prescription drug management.                               Clinical Impression:   Final diagnoses:  [R41.82] Altered mental status               Sukhjinder Quevedo MD  Resident  10/31/23 6903

## 2023-11-01 NOTE — HOSPITAL COURSE
Patient admitted to [facility] for alcoholic intoxication. Allowed to sober up in ED and given IVF but repeat lab work with electrolyte abnormalities likely due to error. Repeat BMP WNL. Patient initially PEC'd overnight due to concern for SI but once allowed to sober up, he is Aox3 without HI/SI. Amlodipine sent to [facility] for HTN noted during admission. Patient will need follow up with PCP. Medically stable for discharge. All questions addressed.

## 2023-11-01 NOTE — DISCHARGE SUMMARY
[facility] - [facility] Dept  [facility] Medicine  Discharge Summary      Patient Name: Leighton Lane  MRN: [id number]  ENE: [id number]  Patient Class: OP- Observation  Admission Date: 10/31/2023  Hospital Length of Stay: 0 days  Discharge Date and Time:  11/01/2023 3:54 PM  Attending Physician: Harvey Olivo MD   Discharging Provider: Isac Art MD  Primary Care Provider: Sofia [facility] Medicine Team: [facility] HOSP MED 3 Isac Art MD  Primary Care Team: [facility] HOSP MED 3    HPI:   Mr. Leighton Lane is a 53 year old male with no admitted past medical history that presents after being found unresponsive on the side of [facility]. EMS arrived on scene and administered Narcan, without improvement. Patient was transported to [facility] ED for further evaluation. Patient was initially somnolent on presentation, but slowly became more conversant and able to provide history. He reports that he had gone to [facility] to celebrate [facility] as he does most years, but drank too much. Patient reports that he does not remember passing out or being picked up by EMS. He denies any prior incidence of blacking out or regular alcohol consumption. Otherwise, patient denies chest pain, SOB, lightheadedness, N/V, dysuria, abdominal pain, flank/back pain.    On presentation, patient's somnolence slowly improved. ED staff noted that patient appeared to be intoxicated. Patient was afebrile and hemodynamically stable. Labs were notable for hypokalemia at 2.7, CO2 at 17, Ca of 6.1, and Mg at 1.4. Serum EtOH was 244 and UDS was negative. Serum tylenol and salicylate levels were undetectable. UA revealed 3+ leukocytes, 84 WBCs, moderate bacteria, and few WBC clumps. Urine culture pending. VBG revealed pH of 7.188, pCO2 of 38.1, pO2 of 28, and HCO3 of 14.5. CT cervical spine was negative for acute fracture, CTH was unremarkale for acute intracranial abnormality, CXR was unremarkable as well.    While in the ED, patient  "threatened to leave AMA, but agreed to stay for continuation of IV electrolyte replacement. He reported to ED staff that he "does not want to die, but does not want to live either," and that he has felt this way for the past few months. A Physicians Emergency Certificate was applied to the patient and psychiatry was consulted for further evaluation of SI. Patient became acutely agitated, threatening staff. IM haldol and ativan were administered and patient declined all further physical examination.      * No surgery found *      Hospital Course:   Patient admitted to [facility] for alcoholic intoxication. Allowed to sober up in ED and given IVF but repeat lab work with electrolyte abnormalities likely due to error. Repeat BMP WNL. Patient initially PEC'd overnight due to concern for SI but once allowed to sober up, he is Aox3 without HI/SI. Amlodipine sent to [facility] for HTN noted during admission. Patient will need follow up with PCP. Medically stable for discharge. All questions addressed.        Goals of Care Treatment Preferences:  Code Status: Full Code      Consults:   Consults (From admission, onward)        Status Ordering Provider     Inpatient consult to Psychiatry  Once        Provider:  (Not yet assigned)    Acknowledged HERMAN VILLARREAL          No new Assessment & Plan notes have been filed under this hospital service since the last note was generated.  Service: Hospital Medicine    Final Active Diagnoses:    Diagnosis Date Noted POA    PRINCIPAL PROBLEM:  Altered mental status [R41.82] 11/01/2023 Unknown    Alcohol abuse [F10.10] 11/01/2023 Unknown    Suicidal ideation [R45.851] 11/01/2023 Not Applicable    Metabolic acidosis, normal anion gap (NAG) [E87.20] 11/01/2023 Unknown    Asymptomatic bacteriuria [R82.71] 11/01/2023 Unknown      Problems Resolved During this Admission:    Diagnosis Date Noted Date Resolved POA    Alcoholism [F10.20] 11/01/2023 11/01/2023 Unknown       Discharged Condition: " good    Disposition: Home or Self Care    Follow Up:   Follow-up Information     Your Primary Care Physician. Schedule an appointment as soon as possible for a visit in 2 days.           [facility] - [facility].    Specialty: Emergency Medicine  Contact information:  [address]  [facility] [address] [phone]                     Patient Instructions:      Ambulatory referral/consult to Internal Medicine   Standing Status: Future   Referral Priority: Routine Referral Type: Consultation   Referral Reason: Specialty Services Required   Requested Specialty: Internal Medicine   Number of Visits Requested: 1       Significant Diagnostic Studies: Labs: All labs within the past 24 hours have been reviewed    Pending Diagnostic Studies:     Procedure Component Value Units Date/Time    Osmolality, Serum [[id number]]     Order Status: Sent Lab Status: No result     Specimen: Blood          Medications:  Reconciled Home Medications:      Medication List      START taking these medications    amLODIPine 5 MG tablet  Commonly known as: NORVASC  Take 1 tablet (5 mg total) by mouth once daily.            Indwelling Lines/Drains at time of discharge:   Lines/Drains/Airways     None                 Time spent on the discharge of patient: 35 minutes         Isac Art MD  Department of Hospital Medicine  [facility]

## 2023-11-01 NOTE — PLAN OF CARE
Initial Discharge Planning Assessment:  Patient admitted on 10-31-23  Chart reviewed, Care plan discussed with treatment team  PCP updated in Epic: yes    Pharmacy updated in Epic: [facility]    DME at home: n/a  Current Dispo: home today  Eager to d/c, states he will seek help when hi is ready, refused resources  Also. PEC'd cancelled, eager to d/c from the E/R  Case management to follow as needed

## 2023-11-01 NOTE — ASSESSMENT & PLAN NOTE
Patient is a 53 year old male that presents after being found by EMS unresponsive following a night of heavy drinking on [address]. Serum EtOH elevated to 244 on admission with otherwise negative UDS. Patient has little recollection of the events leading up to his blackout.  Patient also found to be acidotic with pH of 7.18 and CO2 of 17 without a significant anion gap. Etiology most consistent with ethanol intoxication.    - Will continue to monitor patient's mental status  -  patient on the dangers associated with binge drinking  - Monitoring acid base balance with daily CMPs  - Trending daily CBC, Mg, Phos

## 2023-11-01 NOTE — ED NOTES
Pt wanned by security, belongings bagged and removed from the room. Shorts, socks, hat placed in PEC closet. Phone, gold watch, gold clip on earrings, gold bracelet, gold necklace with cross pendent, wallet, and $246 placed in safe. Direct visual contact established.

## 2023-11-01 NOTE — PLAN OF CARE
[facility]  Discharge Final Note    Primary Care Provider: Deny Black Of    Expected Discharge Date: 11/1/2023    Final Discharge Note (most recent)       Final Note - 11/01/23 1650          Final Note    Assessment Type Final Discharge Note (P)      Anticipated Discharge Disposition Home or Self Care (P)      Hospital Resources/Appts/Education Provided Provided patient/caregiver with written discharge plan information (P)         Post-Acute Status    Discharge Delays None known at this time (P)                      Important Message from Medicare             Contact Info       Your Primary Care Physician        Next Steps: Schedule an appointment as soon as possible for a visit in 2 day(s)    [facility]   Specialty: Emergency Medicine    [address]  [facility] [address]   Phone: [phone]       Next Steps: Follow up

## 2023-11-01 NOTE — ASSESSMENT & PLAN NOTE
54 y/o male that presents after being found unresponsive after a night of heavy binge drinking. Patient reports that he does not regularly consume alcohol, save for celebrations or holidays.    -  patient on danger of binge drinking  - Replacing Thiamine and Folic acid while inpatient

## 2023-11-01 NOTE — ED NOTES
Pt becoming verbally aggressive and refusing to allow RN to insert IV. MD at bedside, awaiting medication orders.

## 2023-11-01 NOTE — ED NOTES
Physician at bedside, pt expressing the want to be discharged. Pt asks MD about a prescription for antidepressants and elaborates that he struggles with depression and has suicidal thoughts. MD explains to pt that for his safety he is going to issue a PEC hold.

## 2023-11-01 NOTE — H&P
[facility]  [facility]  History & Physical    Patient Name: Leighton Lane  MRN: [id number]  Patient Class: OP- Observation  Admission Date: 10/31/2023  Attending Physician: Harvey Olivo MD   Primary Care Provider: No primary care provider on file.         Patient information was obtained from patient and ER records.     Subjective:     Principal Problem:Altered mental status    Chief Complaint:   Chief Complaint   Patient presents with    Altered Mental Status     Pt [place] down by police [place] bus stop. Nonverbal, only responds to painful stimuli. Unknown pt's baseline or last known normal.         HPI: Mr. Leighton Lane is a 53 year old male with no admitted past medical history that presents after being found unresponsive on the side of [facility] [place]. EMS arrived on scene and administered Narcan, without improvement. Patient was transported to [facility] ED for further evaluation. Patient was initially somnolent on presentation, but slowly became more conversant and able to provide history. He reports that he had gone to [facility] [place] to celebrate [facility] as he does most years, but drank too much. Patient reports that he does not remember passing out or being picked up by EMS. He denies any prior incidence of blacking out or regular alcohol consumption. Otherwise, patient denies chest pain, SOB, lightheadedness, N/V, dysuria, abdominal pain, flank/back pain.    On presentation, patient's somnolence slowly improved. ED staff noted that patient appeared to be intoxicated. Patient was afebrile and hemodynamically stable. Labs were notable for hypokalemia at 2.7, CO2 at 17, Ca of 6.1, and Mg at 1.4. Serum EtOH was 244 and UDS was negative. Serum tylenol and salicylate levels were undetectable. UA revealed 3+ leukocytes, 84 WBCs, moderate bacteria, and few WBC clumps. Urine culture pending. VBG revealed pH of 7.188, pCO2 of 38.1, pO2 of 28, and HCO3 of 14.5. CT cervical spine was negative for  "acute fracture, CTH was unremarkale for acute intracranial abnormality, CXR was unremarkable as well.    While in the ED, patient threatened to leave AMA, but agreed to stay for continuation of IV electrolyte replacement. He reported to ED staff that he "does not want to die, but does not want to live either," and that he has felt this way for the past few months. A Physicians Emergency Certificate was applied to the patient and psychiatry was consulted for further evaluation of SI. Patient became acutely agitated, threatening staff. IM haldol and ativan were administered and patient declined all further physical examination.      History reviewed. No pertinent past medical history.    No past surgical history on file.    Review of patient's allergies indicates:  Not on File    No current facility-administered medications on file prior to encounter.     No current outpatient medications on file prior to encounter.     Family History    None       Tobacco Use    Smoking status: Not on file    Smokeless tobacco: Not on file   Substance and Sexual Activity    Alcohol use: Not on file    Drug use: Not on file    Sexual activity: Not on file     Review of Systems   Constitutional:  Negative for chills and fever.   HENT:  Negative for sore throat.    Eyes:  Negative for visual disturbance.   Respiratory:  Negative for cough and shortness of breath.    Cardiovascular:  Negative for chest pain.   Gastrointestinal:  Negative for abdominal pain, nausea and vomiting.   Genitourinary:  Negative for dysuria, flank pain, frequency and urgency.   Musculoskeletal:  Negative for myalgias.   Neurological:  Negative for dizziness, weakness and light-headedness.     Objective:     Vital Signs (Most Recent):  Temp: 98.6 °F (37 °C) (11/01/23 0330)  Pulse: 101 (11/01/23 0500)  Resp: 20 (11/01/23 0500)  BP: (!) 165/85 (11/01/23 0500)  SpO2: 99 % (11/01/23 0500) Vital Signs (24h Range):  Temp:  [97.5 °F (36.4 °C)-98.6 °F (37 °C)] 98.6 " "°F (37 °C)  Pulse:  [] 101  Resp:  [14-21] 20  SpO2:  [95 %-99 %] 99 %  BP: (138-165)/() 165/85        There is no height or weight on file to calculate BMI.     Physical Exam  Vitals reviewed: Patient acutely agitated, displaying psychomotor agitation, and denies physical exam.                Significant Labs: All pertinent labs within the past 24 hours have been reviewed.  ABGs:   Recent Labs   Lab 10/31/23  2241 11/01/23  0240   PH 7.188* 7.208*   PCO2 38.1 44.2   HCO3 14.5* 17.6*   POCSATURATED 39 37   BE -14* -10*   PO2 28* 26*     CBC:   Recent Labs   Lab 10/31/23  2129   WBC 7.67   HGB 13.1*   HCT 41.0        CMP:   Recent Labs   Lab 10/31/23  2129 11/01/23  0235    146*   K 3.5 2.7*   * 121*   CO2 18* 17*   * 82   BUN 10 8   CREATININE 0.6 0.5   CALCIUM 7.6* 6.1*   PROT 6.8  --    ALBUMIN 3.4*  --    BILITOT 0.6  --    ALKPHOS 47*  --    AST 19  --    ALT 17  --    ANIONGAP 10 8     Lactic Acid: No results for input(s): "LACTATE" in the last 48 hours.  Urine Studies:   Recent Labs   Lab 11/01/23  0210   COLORU Straw   APPEARANCEUA Hazy*   PHUR 6.0   SPECGRAV 1.010   PROTEINUA Negative   GLUCUA Negative   KETONESU Negative   BILIRUBINUA Negative   OCCULTUA Negative   NITRITE Negative   LEUKOCYTESUR 3+*   RBCUA 2   WBCUA 84*   BACTERIA Moderate*   SQUAMEPITHEL 1       Significant Imaging: I have reviewed all pertinent imaging results/findings within the past 24 hours.    Assessment/Plan:     * Altered mental status  Patient is a 53 year old male that presents after being found by EMS unresponsive following a night of heavy drinking on [employer]. Serum EtOH elevated to 244 on admission with otherwise negative UDS. Patient has little recollection of the events leading up to his blackout.  Patient also found to be acidotic with pH of 7.18 and CO2 of 17 without a significant anion gap. Etiology most consistent with ethanol intoxication.    - Will continue to monitor " patient's mental status  -  patient on the dangers associated with binge drinking  - Monitoring acid base balance with daily CMPs  - Trending daily CBC, Mg, Phos      Asymptomatic bacteriuria  Patient denies dysuria, suprapubic/abdominal tenderness, and flank pain after a UA in the ED revealed significant pyuria    - Patient denies symptomatology  - Will hold on treating with antibiotics for now, consider treating if patient's mental or hemodynamic status deteriorate.      Metabolic acidosis, normal anion gap (NAG)  See Altered Mental Status      Suicidal ideation  Patient reports passive suicidal ideation to ED staff, but denies SI in interview with myself. Patient reported that he had felt this way for months leading up to this hospitalization. PEC was placed, and patient became acutely agitated and threatening, requiring IM Haldol and ativan.    - Psychiatry consulted, appreciate recs  - PEC in place, will consider lifting pending psych eval      Alcohol abuse  52 y/o male that presents after being found unresponsive after a night of heavy binge drinking. Patient reports that he does not regularly consume alcohol, save for celebrations or holidays.    -  patient on danger of binge drinking  - Replacing Thiamine and Folic acid while inpatient        VTE Risk Mitigation (From admission, onward)         Ordered     IP VTE LOW RISK PATIENT  Once         11/01/23 0539     Place sequential compression device  Until discontinued         11/01/23 0539                       On 11/01/2023, patient should be placed in hospital observation services under my care in collaboration with Hospital Medicine Team 3.        Wilberto Fay MD  Department of Hospital Medicine  [facility]

## 2023-11-01 NOTE — SUBJECTIVE & OBJECTIVE
History reviewed. No pertinent past medical history.    No past surgical history on file.    Review of patient's allergies indicates:  Not on File    No current facility-administered medications on file prior to encounter.     No current outpatient medications on file prior to encounter.     Family History    None       Tobacco Use    Smoking status: Not on file    Smokeless tobacco: Not on file   Substance and Sexual Activity    Alcohol use: Not on file    Drug use: Not on file    Sexual activity: Not on file     Review of Systems   Constitutional:  Negative for chills and fever.   HENT:  Negative for sore throat.    Eyes:  Negative for visual disturbance.   Respiratory:  Negative for cough and shortness of breath.    Cardiovascular:  Negative for chest pain.   Gastrointestinal:  Negative for abdominal pain, nausea and vomiting.   Genitourinary:  Negative for dysuria, flank pain, frequency and urgency.   Musculoskeletal:  Negative for myalgias.   Neurological:  Negative for dizziness, weakness and light-headedness.     Objective:     Vital Signs (Most Recent):  Temp: 98.6 °F (37 °C) (11/01/23 0330)  Pulse: 101 (11/01/23 0500)  Resp: 20 (11/01/23 0500)  BP: (!) 165/85 (11/01/23 0500)  SpO2: 99 % (11/01/23 0500) Vital Signs (24h Range):  Temp:  [97.5 °F (36.4 °C)-98.6 °F (37 °C)] 98.6 °F (37 °C)  Pulse:  [] 101  Resp:  [14-21] 20  SpO2:  [95 %-99 %] 99 %  BP: (138-165)/() 165/85        There is no height or weight on file to calculate BMI.     Physical Exam  Vitals reviewed: Patient acutely agitated, displaying psychomotor agitation, and denies physical exam.                Significant Labs: All pertinent labs within the past 24 hours have been reviewed.  ABGs:   Recent Labs   Lab 10/31/23  2241 11/01/23  0240   PH 7.188* 7.208*   PCO2 38.1 44.2   HCO3 14.5* 17.6*   POCSATURATED 39 37   BE -14* -10*   PO2 28* 26*     CBC:   Recent Labs   Lab 10/31/23  2129   WBC 7.67   HGB 13.1*   HCT 41.0     "    CMP:   Recent Labs   Lab 10/31/23  2129 11/01/23  0235    146*   K 3.5 2.7*   * 121*   CO2 18* 17*   * 82   BUN 10 8   CREATININE 0.6 0.5   CALCIUM 7.6* 6.1*   PROT 6.8  --    ALBUMIN 3.4*  --    BILITOT 0.6  --    ALKPHOS 47*  --    AST 19  --    ALT 17  --    ANIONGAP 10 8     Lactic Acid: No results for input(s): "LACTATE" in the last 48 hours.  Urine Studies:   Recent Labs   Lab 11/01/23  0210   COLORU Straw   APPEARANCEUA Hazy*   PHUR 6.0   SPECGRAV 1.010   PROTEINUA Negative   GLUCUA Negative   KETONESU Negative   BILIRUBINUA Negative   OCCULTUA Negative   NITRITE Negative   LEUKOCYTESUR 3+*   RBCUA 2   WBCUA 84*   BACTERIA Moderate*   SQUAMEPITHEL 1       Significant Imaging: I have reviewed all pertinent imaging results/findings within the past 24 hours.  "

## 2023-11-02 LAB
BACTERIA UR CULT: NORMAL
BACTERIA UR CULT: NORMAL